# Patient Record
(demographics unavailable — no encounter records)

---

## 2024-11-01 NOTE — HEALTH RISK ASSESSMENT
[Very Good] : ~his/her~  mood as very good [No] : No [No falls in past year] : Patient reported no falls in the past year [0] : 2) Feeling down, depressed, or hopeless: Not at all (0) [PHQ-2 Negative - No further assessment needed] : PHQ-2 Negative - No further assessment needed [Time Spent: ___ Minutes] : I spent [unfilled] minutes performing a depression screening for this patient. [Never] : Never [NO] : No [Patient reported colonoscopy was normal] : Patient reported colonoscopy was normal [With Significant Other] : lives with significant other [Employed] : employed [Feels Safe at Home] : Feels safe at home [Fully functional (bathing, dressing, toileting, transferring, walking, feeding)] : Fully functional (bathing, dressing, toileting, transferring, walking, feeding) [Fully functional (using the telephone, shopping, preparing meals, housekeeping, doing laundry, using] : Fully functional and needs no help or supervision to perform IADLs (using the telephone, shopping, preparing meals, housekeeping, doing laundry, using transportation, managing medications and managing finances) [Reports normal functional visual acuity (ie: able to read med bottle)] : Reports normal functional visual acuity [Patient declined mammogram] : Patient declined mammogram [FreeTextEntry1] : Pt has neuropathy. Recurrent rash around lips.  [[id number]] : 0 [Reports changes in hearing] : Reports no changes in hearing [Reports changes in vision] : Reports no changes in vision [Reports changes in dental health] : Reports no changes in dental health [MammogramComments] : Never [PapSmearComments] : Notes [ColonoscopyDate] : 1/23 [ColonoscopyComments] : Cologuard [FreeTextEntry2] : [facility] [place]

## 2024-11-01 NOTE — HISTORY OF PRESENT ILLNESS
[FreeTextEntry1] : Pt is here for annual PE and to go over chronic medical conditions.Pt is seeing neurologist for CIDP. She is waiting on authorization for infusion tx.

## 2024-11-01 NOTE — PHYSICAL EXAM
[No Acute Distress] : no acute distress [Normal Voice/Communication] : normal voice/communication [No Lymphadenopathy] : no lymphadenopathy [Normal] : normal rate, regular rhythm, normal S1 and S2 and no murmur heard [No Edema] : there was no peripheral edema [Normal Appearance] : normal in appearance [No Masses] : no palpable masses [No Nipple Discharge] : no nipple discharge [No Axillary Lymphadenopathy] : no axillary lymphadenopathy

## 2024-11-01 NOTE — REVIEW OF SYSTEMS
[Chest Pain] : no chest pain [Palpitations] : no palpitations [Shortness Of Breath] : no shortness of breath [Wheezing] : no wheezing [Cough] : no cough [Abdominal Pain] : no abdominal pain [Nausea] : no nausea [Constipation] : no constipation [Diarrhea] : diarrhea [Vomiting] : no vomiting [Dysuria] : no dysuria [Hematuria] : no hematuria [Frequency] : no frequency [FreeTextEntry7] : no blood in stool

## 2024-11-01 NOTE — HEALTH RISK ASSESSMENT
[Very Good] : ~his/her~  mood as very good [No] : No [No falls in past year] : Patient reported no falls in the past year [0] : 2) Feeling down, depressed, or hopeless: Not at all (0) [PHQ-2 Negative - No further assessment needed] : PHQ-2 Negative - No further assessment needed [Time Spent: ___ Minutes] : I spent [unfilled] minutes performing a depression screening for this patient. [Never] : Never [NO] : No [Patient reported colonoscopy was normal] : Patient reported colonoscopy was normal [With Significant Other] : lives with significant other [Employed] : employed [Feels Safe at Home] : Feels safe at home [Fully functional (bathing, dressing, toileting, transferring, walking, feeding)] : Fully functional (bathing, dressing, toileting, transferring, walking, feeding) [Fully functional (using the telephone, shopping, preparing meals, housekeeping, doing laundry, using] : Fully functional and needs no help or supervision to perform IADLs (using the telephone, shopping, preparing meals, housekeeping, doing laundry, using transportation, managing medications and managing finances) [Reports normal functional visual acuity (ie: able to read med bottle)] : Reports normal functional visual acuity [Patient declined mammogram] : Patient declined mammogram [FreeTextEntry1] : Pt has neuropathy. Recurrent rash around lips.  [[id number]] : 0 [Reports changes in hearing] : Reports no changes in hearing [Reports changes in vision] : Reports no changes in vision [Reports changes in dental health] : Reports no changes in dental health [MammogramComments] : Never [PapSmearComments] : Notes [ColonoscopyDate] : 1/23 [ColonoscopyComments] : Cologuard [FreeTextEntry2] : [facility] [place]

## 2025-05-28 NOTE — HISTORY OF PRESENT ILLNESS
[FreeTextEntry1] : 5/28/25 Here in f/u  Does not feel a huge difference with IvIg  NO side effects to the infusion   Increased low back pain  hurts with more activity  not radiating into the legs  does chair yoga  does not want to do PT  wants to be more disciplined   9/25/24 Here in f/u Feels gait and weakness worse.  Reviewed biopsy  No active vasculitis No significant inflammation very focal, subtle endomysial fibrosis noted vessels show moderately severe mural fibrosis  No amyloid   Nerve biopsy shows patchy mild axonal loss associated with regeneration. 24% of fibers exhibit myelin changes - segmental deymyelination and remyelination which is thought to be borderline between pathological and physiological age-related changes. No specific deposition of immunoglobulin or amyloid is readily observed in the nerve and no features of vasculitis are detected in multiple levels of sections. ******************************** 8/8/24 No new complaints Here to review EMG/NCV  7/10/24 This is a 61-year-old right-handed woman who is being seen in neurologic consultation for evaluation of weakness in her legs.  Patient recalls never really being able to run very well.  In her 40s she first noticed mild weakness.  She had difficulty getting up from a sitting position.  Over the last year, she has noticed worsening weakness.  She has to concentrate to move.  Stepping up and down is a big issue.  She notes no pain or burning in her lower extremities.  She does wake up with a mild low back pain.  She has no cramping or burning.  She has no complaints about arms. She has no cardiac history. No problems with abnormal colored urine.  She recalls her mother having significant weakness and being unable to leave her home because of this.  An older sister has similar complaints.  She also has a sister who has schizophrenia who also refuses to move but she is in a facility.  Of note, she has been noticing low back pain over the last year and a half.  It is non radiating.  It really stays in the lower back.  With movement she notes improvement.  She exercises regularly.  She uses an exercise bike and does chair yoga.

## 2025-05-28 NOTE — PHYSICAL EXAM
[FreeTextEntry1] : Physical examination  General: No acute distress, Awake, Alert.  Ext:  2 hammertoe surgeries high arches  right foot deformity after surgery  Mental status  Awake, alert, and oriented to person, time and place, Normal attention span and concentration, Recent and remote memory intact, Language intact, Fund of knowledge intact.  Cranial Nerves  II: VFF  III, IV, VI: PERRL, EOMI.  V: Facial sensation is normal B/L.  VII: Facial strength is normal B/L.  VIII: Gross hearing is intact.  IX, X: Palate is midline and elevates symmetrically.  XI: Trapezius normal strength.  XII: Tongue midline without atrophy or fasciculations.   Motor exam  Muscle tone - no evidence of rigidity or resistance in all 4 extremities.  No atrophy or fasciculations  Muscle Strength: BUE prox 4+/5 RLE is 4+/5, LLE is 4/5 proximally distal leg extension and flexion 4/5 bilaterally ankle dorsiflexion is 4+ bilaterally plantar 5/5 No UE drift.  Reflexes  All present, normal, and symmetrical  Plantars right: mute.  Plantars left: mute.   Coordination  Finger to nose: Normal.  Heel to shin: difficult to do.   Gait  Wide base, unsteady

## 2025-06-03 NOTE — PROCEDURE
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  EMG REPORT WILL BE UPLOADED SEPARATELY AS A PDF

## 2025-06-25 NOTE — CONSULT LETTER
[Dear  ___] : Dear  [unfilled], [Consult Letter:] : I had the pleasure of evaluating your patient, [unfilled]. [Please see my note below.] : Please see my note below. [FreeTextEntry3] : Sincerely,  Mira Talavera M.D.

## 2025-06-25 NOTE — HISTORY OF PRESENT ILLNESS
[FreeTextEntry1] : 6/25/25 here in f/u Reviewed EMG   wants to continue IVIG but wants to try the subq version.   5/28/25 Here in f/u  Does not feel a huge difference with IvIg  NO side effects to the infusion   Increased low back pain  hurts with more activity  not radiating into the legs  does chair yoga  does not want to do PT  wants to be more disciplined   9/25/24 Here in f/u Feels gait and weakness worse.  Reviewed biopsy  No active vasculitis No significant inflammation very focal, subtle endomysial fibrosis noted vessels show moderately severe mural fibrosis  No amyloid   Nerve biopsy shows patchy mild axonal loss associated with regeneration. 24% of fibers exhibit myelin changes - segmental deymyelination and remyelination which is thought to be borderline between pathological and physiological age-related changes. No specific deposition of immunoglobulin or amyloid is readily observed in the nerve and no features of vasculitis are detected in multiple levels of sections. ******************************** 8/8/24 No new complaints Here to review EMG/NCV  7/10/24 This is a 61-year-old right-handed woman who is being seen in neurologic consultation for evaluation of weakness in her legs.  Patient recalls never really being able to run very well.  In her 40s she first noticed mild weakness.  She had difficulty getting up from a sitting position.  Over the last year, she has noticed worsening weakness.  She has to concentrate to move.  Stepping up and down is a big issue.  She notes no pain or burning in her lower extremities.  She does wake up with a mild low back pain.  She has no cramping or burning.  She has no complaints about arms. She has no cardiac history. No problems with abnormal colored urine.  She recalls her mother having significant weakness and being unable to leave her home because of this.  An older sister has similar complaints.  She also has a sister who has schizophrenia who also refuses to move but she is in a facility.  Of note, she has been noticing low back pain over the last year and a half.  It is non radiating.  It really stays in the lower back.  With movement she notes improvement.  She exercises regularly.  She uses an exercise bike and does chair yoga.

## 2025-06-25 NOTE — ASSESSMENT
[FreeTextEntry1] : Repeat EMG/NCV showed improvement electrodiagnostically. She still does not feel much change.   one more infusion and will switch to Hizentra (30 g - this needs to be reconfirmed based on current wait) every two weeks for mainteance -  Will precert with insurance.